# Patient Record
Sex: MALE | Race: BLACK OR AFRICAN AMERICAN
[De-identification: names, ages, dates, MRNs, and addresses within clinical notes are randomized per-mention and may not be internally consistent; named-entity substitution may affect disease eponyms.]

---

## 2021-07-05 ENCOUNTER — HOSPITAL ENCOUNTER (EMERGENCY)
Dept: HOSPITAL 61 - ER | Age: 61
Discharge: HOME | End: 2021-07-05
Payer: COMMERCIAL

## 2021-07-05 VITALS — BODY MASS INDEX: 27.41 KG/M2 | HEIGHT: 75 IN | WEIGHT: 220.46 LBS

## 2021-07-05 VITALS — SYSTOLIC BLOOD PRESSURE: 118 MMHG | DIASTOLIC BLOOD PRESSURE: 73 MMHG

## 2021-07-05 DIAGNOSIS — F15.10: Primary | ICD-10-CM

## 2021-07-05 DIAGNOSIS — I10: ICD-10-CM

## 2021-07-05 DIAGNOSIS — E11.9: ICD-10-CM

## 2021-07-05 DIAGNOSIS — Z98.890: ICD-10-CM

## 2021-07-05 LAB
ACETAMIN: < 2 MCG/ML (ref 10–30)
ALBUMIN SERPL-MCNC: 4 G/DL (ref 3.4–5)
ALBUMIN/GLOB SERPL: 1.1 {RATIO} (ref 1–1.7)
ALP SERPL-CCNC: 93 U/L (ref 46–116)
ALT SERPL-CCNC: 32 U/L (ref 16–63)
AMORPH SED URNS QL MICRO: PRESENT /HPF
AMPHETAMINE/METHAMPHETAMINE: (no result)
ANION GAP SERPL CALC-SCNC: 12 MMOL/L (ref 6–14)
APTT PPP: (no result) S
AST SERPL-CCNC: 30 U/L (ref 15–37)
BACTERIA #/AREA URNS HPF: 0 /HPF
BARBITURATES UR-MCNC: (no result) UG/ML
BASOPHILS # BLD AUTO: 0.1 X10^3/UL (ref 0–0.2)
BASOPHILS NFR BLD: 1 % (ref 0–3)
BENZODIAZ UR-MCNC: (no result) UG/L
BILIRUB SERPL-MCNC: 0.5 MG/DL (ref 0.2–1)
BILIRUB UR QL STRIP: (no result)
BUN SERPL-MCNC: 21 MG/DL (ref 8–26)
BUN/CREAT SERPL: 13 (ref 6–20)
CALCIUM SERPL-MCNC: 9.8 MG/DL (ref 8.5–10.1)
CANNABINOIDS UR-MCNC: (no result) UG/L
CHLORIDE SERPL-SCNC: 105 MMOL/L (ref 98–107)
CO2 SERPL-SCNC: 25 MMOL/L (ref 21–32)
COCAINE UR-MCNC: (no result) NG/ML
CREAT SERPL-MCNC: 1.6 MG/DL (ref 0.7–1.3)
EOSINOPHIL NFR BLD: 0.1 X10^3/UL (ref 0–0.7)
EOSINOPHIL NFR BLD: 2 % (ref 0–3)
ERYTHROCYTE [DISTWIDTH] IN BLOOD BY AUTOMATED COUNT: 13.4 % (ref 11.5–14.5)
ETHANOL SERPL-MCNC: < 10 MG/DL (ref 0–10)
FIBRINOGEN PPP-MCNC: (no result) MG/DL
GFR SERPLBLD BASED ON 1.73 SQ M-ARVRAT: 53.8 ML/MIN
GLUCOSE SERPL-MCNC: 161 MG/DL (ref 70–99)
HCT VFR BLD CALC: 40.4 % (ref 39–53)
HGB BLD-MCNC: 14.1 G/DL (ref 13–17.5)
HYALINE CASTS #/AREA URNS LPF: (no result) /HPF
LYMPHOCYTES # BLD: 1.9 X10^3/UL (ref 1–4.8)
LYMPHOCYTES NFR BLD AUTO: 28 % (ref 24–48)
MAGNESIUM SERPL-MCNC: 1.9 MG/DL (ref 1.8–2.4)
MCH RBC QN AUTO: 30 PG (ref 25–35)
MCHC RBC AUTO-ENTMCNC: 35 G/DL (ref 31–37)
MCV RBC AUTO: 86 FL (ref 79–100)
METHADONE SERPL-MCNC: (no result) NG/ML
MONO #: 1.1 X10^3/UL (ref 0–1.1)
MONOCYTES NFR BLD: 15 % (ref 0–9)
NEUT #: 3.8 X10^3/UL (ref 1.8–7.7)
NEUTROPHILS NFR BLD AUTO: 54 % (ref 31–73)
NITRITE UR QL STRIP: NEGATIVE
OPIATES UR-MCNC: (no result) NG/ML
PCP SERPL-MCNC: (no result) MG/DL
PH UR STRIP: 5 [PH]
PLATELET # BLD AUTO: 252 X10^3/UL (ref 140–400)
POTASSIUM SERPL-SCNC: 3.1 MMOL/L (ref 3.5–5.1)
PROT SERPL-MCNC: 7.8 G/DL (ref 6.4–8.2)
PROT UR STRIP-MCNC: >=300 MG/DL
RBC # BLD AUTO: 4.7 X10^6/UL (ref 4.3–5.7)
RBC #/AREA URNS HPF: 0 /HPF (ref 0–2)
SALIC: < 2.8 MG/DL (ref 2.8–20)
SODIUM SERPL-SCNC: 142 MMOL/L (ref 136–145)
UROBILINOGEN UR-MCNC: 0.2 MG/DL
WBC # BLD AUTO: 7 X10^3/UL (ref 4–11)
WBC #/AREA URNS HPF: (no result) /HPF (ref 0–4)

## 2021-07-05 PROCEDURE — 96361 HYDRATE IV INFUSION ADD-ON: CPT

## 2021-07-05 PROCEDURE — 80053 COMPREHEN METABOLIC PANEL: CPT

## 2021-07-05 PROCEDURE — 99285 EMERGENCY DEPT VISIT HI MDM: CPT

## 2021-07-05 PROCEDURE — G0480 DRUG TEST DEF 1-7 CLASSES: HCPCS

## 2021-07-05 PROCEDURE — 96374 THER/PROPH/DIAG INJ IV PUSH: CPT

## 2021-07-05 PROCEDURE — 85025 COMPLETE CBC W/AUTO DIFF WBC: CPT

## 2021-07-05 PROCEDURE — 83735 ASSAY OF MAGNESIUM: CPT

## 2021-07-05 PROCEDURE — 80329 ANALGESICS NON-OPIOID 1 OR 2: CPT

## 2021-07-05 PROCEDURE — 80307 DRUG TEST PRSMV CHEM ANLYZR: CPT

## 2021-07-05 PROCEDURE — 81001 URINALYSIS AUTO W/SCOPE: CPT

## 2021-07-05 PROCEDURE — 36415 COLL VENOUS BLD VENIPUNCTURE: CPT

## 2021-07-05 NOTE — PHYS DOC
Past Medical History


Past Medical History:  Diabetes-Type II, Hypertension, Other


Additional Past Medical Histor:  elevated cholesterol


Past Surgical History:  Other


Additional Past Surgical Histo:  hernia repair X2


Smoking Status:  Never Smoker


Alcohol Use:  Occasionally


Drug Use:  Marijuana





General Adult


EDM:


Chief Complaint:  DRUG ABUSE





HPI:


HPI:





Patient is a 61  year old male who presented to for evaluation of substance 

abuse.  Patient said he has been using methamphetamines, has some hallucinations

yesterday, patient is afraid that his wife will leave him so he came here 

seeking help.  Patient denies suicidal ideation denies homicidal lesion.  Patie

nt denies any chest pain, no abdominal pain, no cough or fever.  Patient denies 

any other drug use.





Review of Systems:


Review of Systems:


Constitutional:   Denies fever or chills. []


Eyes:   Denies change in visual acuity. []


HENT:   Denies nasal congestion or sore throat. [] 


Respiratory:   Denies cough or shortness of breath. [] 


Cardiovascular:   Denies chest pain or edema. [] 


GI:   Denies abdominal pain, nausea, vomiting, bloody stools or diarrhea. [] 


:  Denies dysuria. [] 


Musculoskeletal:   Denies back pain or joint pain. [] 


Integument:   Denies rash. [] 


Neurologic:   Denies headache, focal weakness or sensory changes. [] 


Endocrine:   Denies polyuria or polydipsia. [] 


Lymphatic:  Denies swollen glands. [] 


Psychiatric:  Denies depression or anxiety. []





Heart Score:


C/O Chest Pain:  N/A


Risk Factors:


Risk Factors:  DM, Current or recent (<one month) smoker, HTN, HLP, family 

history of CAD, obesity.


Risk Scores:


Score 0 - 3:  2.5% MACE over next 6 weeks - Discharge Home


Score 4 - 6:  20.3% MACE over next 6 weeks - Admit for Clinical Observation


Score 7 - 10:  72.7% MACE over next 6 weeks - Early Invasive Strategies





Current Medications:





Current Medications








 Medications


  (Trade)  Dose


 Ordered  Sig/Michelle  Start Time


 Stop Time Status Last Admin


Dose Admin


 


 Lorazepam


  (Ativan Inj)  1 mg  1X  ONCE  7/5/21 19:45


 7/5/21 19:46 DC 7/5/21 19:50


1 MG


 


 Potassium Chloride


  (Klor-Con)  40 meq  1X  ONCE  7/5/21 20:00


 7/5/21 20:01 DC 7/5/21 20:03


40 MEQ


 


 Sodium Chloride  1,000 ml @ 


 1,000 mls/hr  1X  ONCE  7/5/21 19:45


 7/5/21 20:44  7/5/21 19:50


1,000 MLS/HR











Allergies:


Allergies:





Allergies








Coded Allergies Type Severity Reaction Last Updated Verified


 


  No Known Drug Allergies    2/19/16 No











Physical Exam:


PE:





Constitutional: Well developed, well nourished, no acute distress, non-toxic 

appearance. []


HENT: Normocephalic, atraumatic, bilateral external ears normal, oropharynx 

moist, no oral exudates, nose normal. []


Eyes: PERRLA, EOMI, conjunctiva normal, no discharge. [] 


Neck: Normal range of motion, no tenderness, supple, no stridor. [] 


Cardiovascular:Heart rate regular rhythm, no murmur []


Lungs & Thorax:  Bilateral breath sounds clear to auscultation []


Abdomen: Bowel sounds normal, soft, no tenderness, no masses, no pulsatile 

masses. [] 


Skin: Warm, dry, no erythema, no rash. [] 


Back: No tenderness, no CVA tenderness. [] 


Extremities: No tenderness, no cyanosis, no clubbing, ROM intact, no edema. [] 


Neurologic: Alert and oriented X 3, normal motor function, normal sensory 

function, no focal deficits noted. []


Psychologic: Affect normal, judgement normal, mood normal. []





Current Patient Data:


Labs:





                                Laboratory Tests








Test


 7/5/21


19:08 7/5/21


19:19


 


Urine Collection Type Unknown   


 


Urine Color Debra   


 


Urine Clarity Turbid   


 


Urine pH


 5.0 (<5.0-8.0)


 





 


Urine Specific Gravity


 >=1.030


(1.000-1.030) 





 


Urine Protein


 >=300 mg/dL


(NEG-TRACE) 





 


Urine Glucose (UA)


 Negative mg/dL


(NEG) 





 


Urine Ketones (Stick)


 Trace mg/dL


(NEG) 





 


Urine Blood


 Moderate (NEG)


 





 


Urine Nitrite


 Negative (NEG)


 





 


Urine Bilirubin Small (NEG)   


 


Urine Urobilinogen Dipstick


 0.2 mg/dL (0.2


mg/dL) 





 


Urine Leukocyte Esterase


 Negative (NEG)


 





 


Urine RBC 0 /HPF (0-2)   


 


Urine WBC


 Rare /HPF


(0-4) 





 


Urine Squamous Epithelial


Cells Occ /LPF  


 





 


Urine Amorphous Sediment Present /HPF   


 


Urine Bacteria


 0 /HPF (0-FEW)


 





 


Urine Hyaline Casts Many /HPF   


 


Urine Mucus Marked /LPF   


 


Urine Opiates Screen Neg (NEG)   


 


Urine Methadone Screen Neg (NEG)   


 


Urine Barbiturates Neg (NEG)   


 


Urine Phencyclidine Screen Neg (NEG)   


 


Urine


Amphetamine/Methamphetamine Pos (NEG)  


 





 


Urine Benzodiazepines Screen Neg (NEG)   


 


Urine Cocaine Screen Neg (NEG)   


 


Urine Cannabinoids Screen Neg (NEG)   


 


Urine Ethyl Alcohol Neg (NEG)   


 


White Blood Count


 


 7.0 x10^3/uL


(4.0-11.0)


 


Red Blood Count


 


 4.70 x10^6/uL


(4.30-5.70)


 


Hemoglobin


 


 14.1 g/dL


(13.0-17.5)


 


Hematocrit


 


 40.4 %


(39.0-53.0)


 


Mean Corpuscular Volume


 


 86 fL ()





 


Mean Corpuscular Hemoglobin  30 pg (25-35)  


 


Mean Corpuscular Hemoglobin


Concent 


 35 g/dL


(31-37)


 


Red Cell Distribution Width


 


 13.4 %


(11.5-14.5)


 


Platelet Count


 


 252 x10^3/uL


(140-400)


 


Neutrophils (%) (Auto)  54 % (31-73)  


 


Lymphocytes (%) (Auto)  28 % (24-48)  


 


Monocytes (%) (Auto)  15 % (0-9)  H


 


Eosinophils (%) (Auto)  2 % (0-3)  


 


Basophils (%) (Auto)  1 % (0-3)  


 


Neutrophils # (Auto)


 


 3.8 x10^3/uL


(1.8-7.7)


 


Lymphocytes # (Auto)


 


 1.9 x10^3/uL


(1.0-4.8)


 


Monocytes # (Auto)


 


 1.1 x10^3/uL


(0.0-1.1)


 


Eosinophils # (Auto)


 


 0.1 x10^3/uL


(0.0-0.7)


 


Basophils # (Auto)


 


 0.1 x10^3/uL


(0.0-0.2)


 


Sodium Level


 


 142 mmol/L


(136-145)


 


Potassium Level


 


 3.1 mmol/L


(3.5-5.1)  L


 


Chloride Level


 


 105 mmol/L


()


 


Carbon Dioxide Level


 


 25 mmol/L


(21-32)


 


Anion Gap  12 (6-14)  


 


Blood Urea Nitrogen


 


 21 mg/dL


(8-26)


 


Creatinine


 


 1.6 mg/dL


(0.7-1.3)  H


 


Estimated GFR


(Cockcroft-Gault) 


 53.8  





 


BUN/Creatinine Ratio  13 (6-20)  


 


Glucose Level


 


 161 mg/dL


(70-99)  H


 


Calcium Level


 


 9.8 mg/dL


(8.5-10.1)


 


Magnesium Level


 


 1.9 mg/dL


(1.8-2.4)


 


Total Bilirubin


 


 0.5 mg/dL


(0.2-1.0)


 


Aspartate Amino Transferase


(AST) 


 30 U/L (15-37)





 


Alanine Aminotransferase (ALT)


 


 32 U/L (16-63)





 


Alkaline Phosphatase


 


 93 U/L


()


 


Total Protein


 


 7.8 g/dL


(6.4-8.2)


 


Albumin


 


 4.0 g/dL


(3.4-5.0)


 


Albumin/Globulin Ratio  1.1 (1.0-1.7)  


 


Salicylates Level


 


 < 2.8 mg/dL


(2.8-20.0)  L


 


Salicylate Last Dose Date    


 


Salicylate Last Dose Time    


 


Acetaminophen Level


 


 < 2 mcg/ml


(10-30)  L


 


Acetaminophen Last Dose Date    


 


Acetaminophen Last Dose Time    


 


Ethyl Alcohol Level


 


 < 10 mg/dL


(0-10)





                                Laboratory Tests


7/5/21 19:19








                                Laboratory Tests


7/5/21 19:19








Vital Signs:





                                   Vital Signs








  Date Time  Temp Pulse Resp B/P (MAP) Pulse Ox O2 Delivery O2 Flow Rate FiO2


 


7/5/21 19:05 98.1 108 18 129/88 (102) 97 Room Air  





 98.1       











EKG:


EKG:


[]





Radiology/Procedures:


Radiology/Procedures:


[]





Course & Med Decision Making:


Course & Med Decision Making


Pertinent Labs and Imaging studies reviewed. (See chart for details)





Patient is a 61-year-old male who present to ER for evaluation of substance 

abuse.  Patient has history of methamphetamine addict, he has been sober for 5-

year, start doing methamphetamine again 3 days ago.  Patient said he felt  

guilty about that.  He was afraid that his wife will leave him so he come here 

for help.  Patient denies any suicidal ideation, denies homicidal nation.  

Patient felt much better in the ER, he was evaluated by the psychiatric 

assessment team, denies suicidal ideation to her.  Recommend to discharge home.





Dragon Disclaimer:


Dragon Disclaimer:


This electronic medical record was generated, in whole or in part, using a voice

 recognition dictation system.





Departure


Departure


Impression:  


   Primary Impression:  


   Substance abuse


Disposition:  01 HOME / SELF CARE / HOMELESS


Condition:  STABLE


Referrals:  


NO PCP (PCP)


Follow up with [facility]





[address] [address]


[phone]





24-HOUR Crisis Line: [phone]


Patient Instructions:  Substance Abuse-Brief





Additional Instructions:  


Thank you for visiting our Emergency Department. We appreciate you trusting us 

with your care. If any additional problems come up don't hesitate to return to 

visit us. Please follow up with your primary care provider so they can plan 

additional care if needed and know about the problem that you had. If symptoms 

worsen come back to the Emergency Department. Any concerning symptoms that start

 such as chest pain, shortness of air, weakness or numbness on one side of the 

body, running high fevers or any other concerning symptoms return to the ER.











TONY MUSA DO                 Jul 5, 2021 20:09

## 2021-10-08 ENCOUNTER — HOSPITAL ENCOUNTER (EMERGENCY)
Dept: HOSPITAL 61 - ER | Age: 61
Discharge: HOME | End: 2021-10-08
Payer: SELF-PAY

## 2021-10-08 VITALS — DIASTOLIC BLOOD PRESSURE: 98 MMHG | SYSTOLIC BLOOD PRESSURE: 186 MMHG

## 2021-10-08 VITALS — WEIGHT: 226.41 LBS | HEIGHT: 75 IN | BODY MASS INDEX: 28.15 KG/M2

## 2021-10-08 DIAGNOSIS — I10: ICD-10-CM

## 2021-10-08 DIAGNOSIS — E11.9: ICD-10-CM

## 2021-10-08 DIAGNOSIS — E78.00: ICD-10-CM

## 2021-10-08 DIAGNOSIS — L24.3: Primary | ICD-10-CM

## 2021-10-08 PROCEDURE — 99282 EMERGENCY DEPT VISIT SF MDM: CPT

## 2021-10-08 NOTE — PHYS DOC
Past Medical History


Past Medical History:  Diabetes-Type II, Hypertension, Other


Additional Past Medical Histor:  elevated cholesterol


 ([place])


Past Surgical History:  Other


Additional Past Surgical Histo:  hernia repair X2


 ([place])


Smoking Status:  Never Smoker


Alcohol Use:  Occasionally


Drug Use:  Marijuana


 ([place])





General Adult


EDM:


Chief Complaint:  SKIN PROBLEM





HPI:


HPI:





Patient is a 61 year old male with history of seasonal allergies who presents wi

th perioral dry cracked skin.  Patient states that 2 weeks ago, he used the just

for men beard dye on his facial hair.  At that time, he had skin irritation and 

significant swelling.  He was seen by his primary care at [facility] was given

a prescription for prednisone.  He states that the swelling has improved 

greatly, however he continues to have itching and burning around his mouth.  

Patient states he has used various kinds of lotion as well as Vaseline in order 

to alleviate his skin discomfort.  Patient has no other complaints at this time.


 ([place])





Review of Systems:


Review of Systems:


ROS negative except as mentioned in HPI.


 ([place])





Heart Score:


C/O Chest Pain:  No


 ([place])





Allergies:


Allergies:





Allergies








Coded Allergies Type Severity Reaction Last Updated Verified


 


  No Known Drug Allergies    2/19/16 No








 ([place])





Physical Exam:


PE:





Constitutional: Well developed, well nourished, no acute distress, non-toxic 

appearance.


HENT: Normocephalic, atraumatic, bilateral external ears normal, oropharynx 

moist, no oral exudates, nose normal.  See skin exam below.


Eyes: PERRLA, EOMI, conjunctiva normal, no discharge.  See skin exam below.


Neck: Normal range of motion, no tenderness, supple, no stridor. 


Cardiovascular: Heart rate regular rhythm, no murmur.


Lungs & Thorax:  Bilateral breath sounds clear to auscultation.


Skin: Skin is dry, cracked, and flaky 3 mm margin surrounding patient's goatee 

facial hair.  Additionally, there is 1 area of similarly irritated skin inferior

laterally to the right eye.  Skin otherwise warm, dry, no erythema, no rash. 


 ([place])





Current Patient Data:


Vital Signs:





                                   Vital Signs








  Date Time  Temp Pulse Resp B/P (MAP) Pulse Ox O2 Delivery O2 Flow Rate FiO2


 


10/8/21 11:00 98.4 60 18 186/98 (127) 99 Room Air  





 98.4       








 ([place])





Course & Med Decision Making:


Course & Med Decision Making


Pertinent Labs and Imaging studies reviewed. (See chart for details)





Patient's presentation is consistent with irritant contact dermatitis.  Patient 

is instructed to use topical over-the-counter Aquaphor around the areas of 

irritation.  Additionally, he can use 2 tablets of Benadryl at night, which can 

immediately will help him to sleep better.  He should also take over-the-counter

daily antihistamine, which will help with his reported seasonal allergies as 

well.  Patient understands and is agreeable to discharge plan.


 ([place])


Course & Med Decision Making


I have reviewed and was available for consultation in the emergency department 

for this patient that was seen by midlevel provider. Agree with plan. 





Benjamin España DO


 (BENJAMIN ESPAÑA DO)


Princess Disclaimer:


Dragon Disclaimer:


This electronic medical record was generated, in whole or in part, using a voice

recognition dictation system.


 ([place])





Departure


Departure


Impression:  


   Primary Impression:  


   Contact dermatitis


   Qualified Codes:  L24.3 - Irritant contact dermatitis due to cosmetics


Disposition:  01 HOME / SELF CARE / HOMELESS


Condition:  STABLE


Referrals:  


NO PCP (PCP)


Patient Instructions:  Contact Dermatitis, Easy-to-Read





Additional Instructions:  


[facility]


[facility]


Address: [address] [place] [address]


Phone: [phone]





As discussed, the skin irritation should be treated by topical Aquaphor 

ointment, 2 tablets of Benadryl each night, and an oral antihistamine (Zyrtec, 

Claritin, Allegra) every morning.  Above, there is contact information for a 

dermatologist.  If your symptoms do not improve, you may follow-up with them at 

your convenience.  Please return to the emergency department if you develop a 

fever, if the skin irritation becomes swollen/warm/dry, or if you have thick 

discharge from the affected skin.











[place]               Oct 8, 2021 11:32


BENJAMIN ESPAÑA DO             Oct 11, 2021 06:52